# Patient Record
Sex: FEMALE | Race: OTHER | Employment: STUDENT | URBAN - METROPOLITAN AREA
[De-identification: names, ages, dates, MRNs, and addresses within clinical notes are randomized per-mention and may not be internally consistent; named-entity substitution may affect disease eponyms.]

---

## 2022-12-08 ENCOUNTER — HOSPITAL ENCOUNTER (OUTPATIENT)
Age: 22
Discharge: HOME | End: 2022-12-08
Payer: COMMERCIAL

## 2022-12-08 VITALS
OXYGEN SATURATION: 98 % | DIASTOLIC BLOOD PRESSURE: 75 MMHG | WEIGHT: 208 LBS | BODY MASS INDEX: 32.65 KG/M2 | TEMPERATURE: 97 F | HEIGHT: 67 IN | HEART RATE: 92 BPM | SYSTOLIC BLOOD PRESSURE: 110 MMHG | RESPIRATION RATE: 18 BRPM

## 2022-12-08 DIAGNOSIS — R10.2 PERINEAL PAIN: Primary | ICD-10-CM

## 2022-12-08 DIAGNOSIS — U07.1 POSITIVE SELF-ADMINISTERED ANTIGEN TEST FOR COVID-19: ICD-10-CM

## 2022-12-08 LAB — B-HCG UR QL: NEGATIVE

## 2022-12-08 PROCEDURE — 87808 TRICHOMONAS ASSAY W/OPTIC: CPT | Performed by: NURSE PRACTITIONER

## 2022-12-08 PROCEDURE — 87491 CHLMYD TRACH DNA AMP PROBE: CPT | Performed by: NURSE PRACTITIONER

## 2022-12-08 PROCEDURE — 87591 N.GONORRHOEAE DNA AMP PROB: CPT | Performed by: NURSE PRACTITIONER

## 2022-12-08 PROCEDURE — 99203 OFFICE O/P NEW LOW 30 MIN: CPT | Performed by: NURSE PRACTITIONER

## 2022-12-08 PROCEDURE — 87529 HSV DNA AMP PROBE: CPT | Performed by: NURSE PRACTITIONER

## 2022-12-08 PROCEDURE — 87205 SMEAR GRAM STAIN: CPT | Performed by: NURSE PRACTITIONER

## 2022-12-08 PROCEDURE — 87106 FUNGI IDENTIFICATION YEAST: CPT | Performed by: NURSE PRACTITIONER

## 2022-12-08 PROCEDURE — G0452 MOLECULAR PATHOLOGY INTERPR: HCPCS | Performed by: NURSE PRACTITIONER

## 2022-12-08 PROCEDURE — 81025 URINE PREGNANCY TEST: CPT | Performed by: NURSE PRACTITIONER

## 2022-12-08 RX ORDER — PROPRANOLOL HYDROCHLORIDE 20 MG/5ML
20 SOLUTION ORAL DAILY
COMMUNITY

## 2022-12-08 RX ORDER — LAMOTRIGINE 200 MG/1
200 TABLET ORAL DAILY
COMMUNITY

## 2022-12-08 RX ORDER — ARIPIPRAZOLE 5 MG/1
5 TABLET ORAL 3 TIMES DAILY
COMMUNITY

## 2022-12-08 RX ORDER — FLUOXETINE HYDROCHLORIDE 20 MG/1
20 CAPSULE ORAL DAILY
COMMUNITY

## 2022-12-08 RX ORDER — NORGESTREL AND ETHINYL ESTRADIOL 0.3-0.03MG
1 KIT ORAL DAILY
COMMUNITY

## 2022-12-08 RX ORDER — FLUTICASONE PROPIONATE 50 MCG
2 SPRAY, SUSPENSION (ML) NASAL DAILY
COMMUNITY

## 2022-12-08 RX ORDER — CETIRIZINE HYDROCHLORIDE 10 MG/1
10 TABLET ORAL DAILY
COMMUNITY

## 2022-12-08 NOTE — ED INITIAL ASSESSMENT (HPI)
Pt presents with Covid+ Antigen Test yesterday. Pt reports headache, cough and sore throat x 2 days. Pt also reports unprotected sex last week. Pt reports pain to anus. \"I did not have anal sex, but that area is painful\", per pt. No rectal bleeding reported. Small amount of whitish vaginal discharge today. Pt reports burning with urination.

## 2022-12-09 LAB
C TRACH DNA SPEC QL NAA+PROBE: NEGATIVE
HSV1 DNA SPEC QL NAA+PROBE: NEGATIVE
HSV2 DNA SPEC QL NAA+PROBE: NEGATIVE
N GONORRHOEA DNA SPEC QL NAA+PROBE: NEGATIVE

## 2022-12-09 NOTE — DISCHARGE INSTRUCTIONS
Use squirt bottle to clean vaginal area instead of wiping if painful  Wear cotton underwear  Use lube while having intercourse to prevent tearing  Follow up with your gynecologist  Culture results should be back in 1-2 days and then we will treat accordingly

## 2022-12-09 NOTE — ED QUICK NOTES
Urine Clinitek Results did not populate in pts chart.      Results:  Glu Neg  Bili Neg  Ketones Neg  SG 1.030  Blood Neg  Ph 5.5  Prot 1.0  Urobil 2.0  Nit Neg  Leuk Neg

## 2022-12-10 LAB
GENITAL VAGINOSIS SCREEN: NEGATIVE
TRICHOMONAS SCREEN: NEGATIVE

## 2022-12-13 LAB
BILIRUB UR QL STRIP: NEGATIVE
CLARITY UR: CLEAR
GLUCOSE UR STRIP-MCNC: NEGATIVE MG/DL
HGB UR QL STRIP: NEGATIVE
LEUKOCYTE ESTERASE UR QL STRIP: NEGATIVE
NITRITE UR QL STRIP: NEGATIVE
PH UR STRIP: 5.5 [PH]
PROT UR STRIP-MCNC: 100 MG/DL
SP GR UR STRIP: >=1.03
UROBILINOGEN UR STRIP-ACNC: 2 MG/DL

## 2023-09-02 ENCOUNTER — HOSPITAL ENCOUNTER (OUTPATIENT)
Age: 23
Discharge: HOME | End: 2023-09-02
Payer: COMMERCIAL

## 2023-09-02 VITALS
HEART RATE: 80 BPM | OXYGEN SATURATION: 100 % | RESPIRATION RATE: 20 BRPM | DIASTOLIC BLOOD PRESSURE: 66 MMHG | TEMPERATURE: 98 F | SYSTOLIC BLOOD PRESSURE: 133 MMHG

## 2023-09-02 DIAGNOSIS — Z20.822 ENCOUNTER FOR LABORATORY TESTING FOR COVID-19 VIRUS: ICD-10-CM

## 2023-09-02 DIAGNOSIS — J20.9 ACUTE BRONCHITIS, UNSPECIFIED ORGANISM: Primary | ICD-10-CM

## 2023-09-02 LAB — SARS-COV-2 RNA RESP QL NAA+PROBE: NOT DETECTED

## 2023-09-02 RX ORDER — MULTIVITAMIN WITH IRON
50 TABLET ORAL DAILY
COMMUNITY

## 2023-09-02 RX ORDER — MULTIVIT-MIN/IRON/FOLIC ACID/K 18-600-40
CAPSULE ORAL
COMMUNITY

## 2023-09-02 RX ORDER — METHYLPHENIDATE HYDROCHLORIDE 5 MG/1
5 TABLET ORAL DAILY
COMMUNITY

## 2023-09-02 RX ORDER — BENZONATATE 200 MG/1
200 CAPSULE ORAL 3 TIMES DAILY PRN
Qty: 15 CAPSULE | Refills: 0 | Status: SHIPPED | OUTPATIENT
Start: 2023-09-02

## 2023-11-15 ENCOUNTER — HOSPITAL ENCOUNTER (OUTPATIENT)
Age: 23
Discharge: HOME | End: 2023-11-15
Payer: COMMERCIAL

## 2023-11-15 VITALS
DIASTOLIC BLOOD PRESSURE: 83 MMHG | TEMPERATURE: 99 F | RESPIRATION RATE: 20 BRPM | OXYGEN SATURATION: 97 % | HEART RATE: 85 BPM | SYSTOLIC BLOOD PRESSURE: 126 MMHG

## 2023-11-15 DIAGNOSIS — U07.1 COVID-19 VIRUS DETECTED: Primary | ICD-10-CM

## 2023-11-15 DIAGNOSIS — Z20.822 ENCOUNTER FOR LABORATORY TESTING FOR COVID-19 VIRUS: ICD-10-CM

## 2023-11-15 LAB — SARS-COV-2 RNA RESP QL NAA+PROBE: DETECTED

## 2023-11-15 PROCEDURE — 99213 OFFICE O/P EST LOW 20 MIN: CPT | Performed by: NURSE PRACTITIONER

## 2023-11-15 PROCEDURE — U0002 COVID-19 LAB TEST NON-CDC: HCPCS | Performed by: NURSE PRACTITIONER

## 2023-11-15 NOTE — ED INITIAL ASSESSMENT (HPI)
Pt here with complaints of cough , body aches, and chills that started 1 week ago , pt staets she has had multiple family members that tested +covid this week

## 2024-01-06 ENCOUNTER — HOSPITAL ENCOUNTER (OUTPATIENT)
Age: 24
Discharge: HOME | End: 2024-01-06
Payer: COMMERCIAL

## 2024-01-06 VITALS
OXYGEN SATURATION: 99 % | HEART RATE: 79 BPM | TEMPERATURE: 98 F | SYSTOLIC BLOOD PRESSURE: 117 MMHG | DIASTOLIC BLOOD PRESSURE: 75 MMHG | RESPIRATION RATE: 16 BRPM

## 2024-01-06 DIAGNOSIS — H65.03 NON-RECURRENT ACUTE SEROUS OTITIS MEDIA OF BOTH EARS: Primary | ICD-10-CM

## 2024-01-06 NOTE — ED PROVIDER NOTES
Patient Seen in: [facility]      History     Chief Complaint   Patient presents with    Ear Problem Pain     Stated Complaint: EAR ACHE    Subjective:   HPI  Patient is a 24-year-old female who presents to the immediate care center with concern for ear pain bilaterally.  She has had mild congestion last several days with pressure-like sensation on her ears.  She recently had been recovering well from the surgery.  She has had no headache or dizziness; no fever or chills.  She denies all recent trauma.          Objective:   Past Medical History:   Diagnosis Date    ADHD (attention deficit hyperactivity disorder)     Anxiety     Depression               History reviewed. No pertinent surgical history.             Social History     Socioeconomic History    Marital status: Single   Tobacco Use    Smoking status: Never    Smokeless tobacco: Never   Vaping Use    Vaping Use: Never used   Substance and Sexual Activity    Alcohol use: Never    Drug use: Yes     Frequency: 3.0 times per week     Types: Cannabis              Review of Systems   Constitutional:  Negative for chills and fever.   HENT:  Positive for congestion and ear pain.    Musculoskeletal:  Negative for neck pain.   Skin:  Negative for rash.   Neurological:  Negative for dizziness, weakness and headaches.       Positive for stated complaint: EAR ACHE  Other systems are as noted in HPI.  Constitutional and vital signs reviewed.      All other systems reviewed and negative except as noted above.    Physical Exam     ED Triage Vitals [01/06/24 1145]   /75   Pulse 79   Resp 16   Temp 97.6 °F (36.4 °C)   Temp src Temporal   SpO2 99 %   O2 Device None (Room air)       Current:/75   Pulse 79   Temp 97.6 °F (36.4 °C) (Temporal)   Resp 16   SpO2 99%         Physical Exam  Vitals and nursing note reviewed.   Constitutional:       General: She is not in acute distress.  HENT:      Right Ear: No drainage or swelling. A middle ear effusion  is present. Tympanic membrane is not erythematous.      Left Ear: No drainage or swelling. A middle ear effusion is present. Tympanic membrane is not erythematous.   Eyes:      Conjunctiva/sclera: Conjunctivae normal.   Musculoskeletal:      Cervical back: Normal range of motion and neck supple.   Skin:     General: Skin is warm and dry.   Neurological:      Mental Status: She is alert and oriented to person, place, and time.   Psychiatric:         Behavior: Behavior normal.               ED Course   Labs Reviewed - No data to display                   MDM      Patient was encouraged to use over-the-counter nasal corticosteroid medication.  However, as she recently has had her tonsils and adenoids removed, she was asked to contact her ENT specialist in 2 days to make sure there comfortable with her using this medication postoperatively.  She states understanding and will do so.                                 Medical Decision Making  Differential diagnoses considered include, but are not exclusive of: Acute otitis media, suppurative; acute otitis externa; acute otitis media, serous; ruptured tympanic membrane; mastoiditis.      Problems Addressed:  Non-recurrent acute serous otitis media of both ears: self-limited or minor problem    Risk  OTC drugs.        Disposition and Plan     Clinical Impression:  1. Non-recurrent acute serous otitis media of both ears         Disposition:  Discharge  1/6/2024 12:23 pm    Follow-up:  Your ENT surgeon  Call in 2 days to discuss whether or not they are ok with you using OTC nasal steroid medication.              Medications Prescribed:  Current Discharge Medication List

## 2024-01-06 NOTE — ED INITIAL ASSESSMENT (HPI)
Pt has had bilateral ear pain, clogged feeling and \"popping\" since 12/22/23 after getting tonsils and adenoids removed.

## 2024-01-06 NOTE — DISCHARGE INSTRUCTIONS
Call in 2 days to discuss whether or not they are ok with you using OTC nasal steroid medication.     Use Nasacort (triamcinolone) or Flonase (fluticasone) if they have improved you to do so.  
Patient information on fall and injury prevention

## 2025-03-07 ENCOUNTER — HOSPITAL ENCOUNTER (OUTPATIENT)
Age: 25
Discharge: HOME | End: 2025-03-07
Payer: COMMERCIAL

## 2025-03-07 VITALS
HEART RATE: 68 BPM | SYSTOLIC BLOOD PRESSURE: 113 MMHG | RESPIRATION RATE: 12 BRPM | OXYGEN SATURATION: 99 % | DIASTOLIC BLOOD PRESSURE: 73 MMHG | TEMPERATURE: 98 F

## 2025-03-07 DIAGNOSIS — R19.5 CHANGE IN STOOL: Primary | ICD-10-CM

## 2025-03-07 PROBLEM — F31.9 BIPOLAR 1 DISORDER (HCC): Status: ACTIVE | Noted: 2025-03-07

## 2025-03-07 PROCEDURE — 99212 OFFICE O/P EST SF 10 MIN: CPT | Performed by: NURSE PRACTITIONER

## 2025-03-07 NOTE — DISCHARGE INSTRUCTIONS
GI care measures   - Wash hands often   - Disinfect your environment, linens, electronics, etc.   - Drink plenty of fluids (e.g. water, Pedialyte)   - Get plenty of rest   - Do not share utensils or drinks   - Avoid spicy, greasy, fatty, fried, \"fast\" / \"dense\" foods   - Avoid dairy (causes inflammation)   - Avoid soda   - Avoid sugar (causes inflammation)   - Slowly progress diet as tolerated (liquids > smoothies > bananas / toast / apple sauce > others)   - Follow up with your doctor as needed   - Go to ER if you have worsening bleeding during bowel movements and/or progressive weakness

## 2025-03-07 NOTE — ED INITIAL ASSESSMENT (HPI)
Pt presents with abdominal pain with blood when wiping stool. Pt reports history of \"hard stool\".     No NVD.

## 2025-03-07 NOTE — ED PROVIDER NOTES
History     Chief Complaint   Patient presents with    Abdominal Pain    Blood In Stool       Subjective:   HPI    [place], 25 year old female with notable medical history of mental health history who presents with GI concerns. Patient reports having loose stools for the past couple days with fatigue and mild HA, and noting blood on tissue paper when wiping. However, BM pta was w/o blood. Denies noting blood in toilet bowl or mixed in stool. Denies rectal pain / pressure, vaginal concerns, trauma, Hx anemia, other bleeding sites. Denies abdominal pain, n/v/d, but reports feeling as is stomach is upset.      Patient Active Problem List   Diagnosis    Allergic rhinitis    Anxiety disorder    Bipolar 1 disorder (HCC)    Dysmenorrhea in adolescent    Tonsillar hypertrophy    Attention-deficit hyperactivity disorder, predominantly hyperactive type      Objective:   Past Medical History:    ADHD (attention deficit hyperactivity disorder)    Anxiety    Depression              History reviewed. No pertinent surgical history.             Social History     Socioeconomic History    Marital status: Single   Tobacco Use    Smoking status: Never    Smokeless tobacco: Never   Vaping Use    Vaping status: Never Used   Substance and Sexual Activity    Alcohol use: Never    Drug use: Yes     Frequency: 3.0 times per week     Types: Cannabis     Social Drivers of Health     Food Insecurity: No Food Insecurity (7/23/2024)    Received from [facility]    Hunger Vital Sign     Worried About Running Out of Food in the Last Year: Never true     Ran Out of Food in the Last Year: Never true   Transportation Needs: No Transportation Needs (7/23/2024)    Received from [facility]    PRAPARE - Transportation     Lack of Transportation (Medical): No     Lack of Transportation (Non-Medical): No   Housing Stability: Unknown (7/23/2024)    Received from [facility]  [place]    Housing Stability Vital Sign     Unable to Pay for Housing in the Last Year: No     Unstable Housing in the Last Year: No              Medications Ordered Prior to Encounter[1]      Constitutional and vital signs reviewed.      All other systems reviewed and negative except as noted above.    I have reviewed the family history, social history, allergies, and outpatient medications.     History reviewed from EMR: Encounters, problem list, allergies, medications      Physical Exam     ED Triage Vitals [03/07/25 1542]   /73   Pulse 68   Resp 12   Temp 98 °F (36.7 °C)   Temp src Oral   SpO2 99 %   O2 Device None (Room air)       Current:/73   Pulse 68   Temp 98 °F (36.7 °C) (Oral)   Resp 12   SpO2 99%       Physical Exam  Vitals and nursing note reviewed. Exam conducted with a chaperone present.   Constitutional:       General: She is not in acute distress.     Appearance: Normal appearance. She is normal weight. She is not ill-appearing or toxic-appearing.   HENT:      Head: Normocephalic and atraumatic.      Right Ear: External ear normal.      Left Ear: External ear normal.      Nose: Nose normal.      Mouth/Throat:      Mouth: Mucous membranes are moist.   Eyes:      Extraocular Movements: Extraocular movements intact.      Conjunctiva/sclera: Conjunctivae normal.      Pupils: Pupils are equal, round, and reactive to light.   Cardiovascular:      Rate and Rhythm: Normal rate.      Pulses: Normal pulses.   Pulmonary:      Effort: Pulmonary effort is normal. No respiratory distress.   Genitourinary:     Rectum: No mass, tenderness or external hemorrhoid.      Comments: Vida NYE as chaperone. Scant punctate blood on glove after JAN.  Musculoskeletal:         General: No swelling, tenderness or signs of injury. Normal range of motion.      Cervical back: Normal range of motion and neck supple.   Skin:     General: Skin is warm and dry.      Capillary Refill: Capillary refill takes less than 2  seconds.      Coloration: Skin is not jaundiced.   Neurological:      General: No focal deficit present.      Mental Status: She is alert and oriented to person, place, and time. Mental status is at baseline.   Psychiatric:         Mood and Affect: Mood normal.         Behavior: Behavior normal.         Thought Content: Thought content normal.         Judgment: Judgment normal.            ED Course     Labs Reviewed - No data to display  No orders to display       Vitals:    03/07/25 1542   BP: 113/73   Pulse: 68   Resp: 12   Temp: 98 °F (36.7 °C)   TempSrc: Oral   SpO2: 99%            [facility]        Roosevelt Land, 25 year old female with medical history as noted above who presents with stool change   - Patient in NAD, VSS   - hemorrhoids vs viral vs GI bleed vs other   - Scant blood on glove during JAN w/ pain or notable hemorrhoid. No large amount of stool w/in rectal vault   - Suspect transient viral vs food borne illness causing mucosal membrane of anus to become friable   - Reassurance provided   - Supportive care and dietary care discussed   - RTED/IC precautions discussed   - Follow up with primary care provider as needed    - GI contact provided if needed       ** See ED course below for additional information on care provided / interventions / notable events throughout patient's encounter.    ** See Home Care Instructions below for care measures to trial as applicable.         ** I have independently reviewed the radiology images, clinical lab results, and ECG tracings as described above (if applicable)    ** Concerning co-morbidities possibly affecting complaint / care: n/a    ** See disposition & plan section below for home care instructions - if applicable        Medical Decision Making  Risk  OTC drugs.        Disposition and Plan     Disposition:  Discharge  3/7/2025  4:01 pm    Clinical Impression:  1. Change in stool            Home care instructions:    GI care measures   - Wash hands  often   - Disinfect your environment, linens, electronics, etc.   - Drink plenty of fluids (e.g. water, Pedialyte)   - Get plenty of rest   - Do not share utensils or drinks   - Avoid spicy, greasy, fatty, fried, \"fast\" / \"dense\" foods   - Avoid dairy (causes inflammation)   - Avoid soda   - Avoid sugar (causes inflammation)   - Slowly progress diet as tolerated (liquids > smoothies > bananas / toast / apple sauce > others)   - Follow up with your doctor as needed   - Go to ER if you have worsening bleeding during bowel movements and/or progressive weakness      Follow-up:  [facility]  [address]  [place], [place] [id number] [phone]    New Primary  Care to establish care (if needed)    Edwin Valerio MD  [address] [place] [address]  [phone]      Gastrointestinal (GI) specialist          Medications Prescribed:  Discharge Medication List as of 3/7/2025  4:05 PM            Himanshu Shane, DNP, APRN, AGACNP-BC, FNP-C, CNL  Adult-Gerontology Acute Care & Family Nurse Practitioner  [facility]      The above patient (and/or guardian) was made aware that an appropriate evaluation has been performed, and that no additional testing is required at this time. In my medical judgment, there is currently no evidence of an immediate life-threatening or surgical condition, therefore discharge is indicated at this time. The patient (and/or guardian) was advised that a small risk still exists that a serious condition could develop. The patient was instructed to arrange close follow-up with their primary care provider (or the referral provider given today). The patient received written and verbal instructions regarding their condition / concerns, demonstrated understanding, and is agreement with the outpatient treatment plan.              [1]   No current facility-administered medications on file prior to encounter.     Current Outpatient Medications on File Prior to Encounter    Medication Sig Dispense Refill    methylphenidate 5 MG Oral Tab Take 1 tablet (5 mg total) by mouth daily.      vitamin B-12 50 MCG Oral Tab Take 1 tablet (50 mcg total) by mouth daily.      Cholecalciferol (VITAMIN D) 50 MCG (2000 UT) Oral Cap Take by mouth.      benzonatate 200 MG Oral Cap Take 1 capsule (200 mg total) by mouth 3 (three) times daily as needed for cough. 15 capsule 0    lamoTRIgine 200 MG Oral Tab Take 1 tablet (200 mg total) by mouth daily.      norgestrel-ethinyl estradiol (ELINEST) 0.3-30 MG-MCG Oral Tab Take 1 tablet by mouth daily.      propranolol 20 MG/5ML Oral Solution Take 5 mL (20 mg total) by mouth daily.      ARIPiprazole 5 MG Oral Tab Take 1 tablet (5 mg total) by mouth in the morning, at noon, and at bedtime.      FLUoxetine 20 MG Oral Cap Take 1 capsule (20 mg total) by mouth daily.      cetirizine 10 MG Oral Tab Take 1 tablet (10 mg total) by mouth daily.      fluticasone propionate 50 MCG/ACT Nasal Suspension 2 sprays by Nasal route daily.      lisdexamfetamine 20 MG Oral Cap Take 1 capsule (20 mg total) by mouth every morning.